# Patient Record
Sex: MALE | Race: WHITE | NOT HISPANIC OR LATINO | URBAN - NONMETROPOLITAN AREA
[De-identification: names, ages, dates, MRNs, and addresses within clinical notes are randomized per-mention and may not be internally consistent; named-entity substitution may affect disease eponyms.]

---

## 2020-03-09 ENCOUNTER — IMPORTED ENCOUNTER (OUTPATIENT)
Dept: URBAN - NONMETROPOLITAN AREA CLINIC 1 | Facility: CLINIC | Age: 45
End: 2020-03-09

## 2020-03-09 PROBLEM — H52.223: Noted: 2020-03-09

## 2020-03-09 PROBLEM — H52.13: Noted: 2020-03-09

## 2020-03-09 PROBLEM — H52.4: Noted: 2020-03-09

## 2020-03-09 PROCEDURE — 92310 CONTACT LENS FITTING OU: CPT

## 2020-03-09 PROCEDURE — 92015 DETERMINE REFRACTIVE STATE: CPT

## 2020-03-09 PROCEDURE — 92004 COMPRE OPH EXAM NEW PT 1/>: CPT

## 2020-03-09 NOTE — PATIENT DISCUSSION
Myopia/Astigmatism/Presbyopia-  Discussed refractive status w/ pt today-  MR done new GLRx given-  Will update CL when pt is ready currently wearing Soft Lens Toric.  He would like to try monovision will order trials and have him return with Maria G to complete fitting. -  Monitor yearly or PRN

## 2020-03-16 ENCOUNTER — IMPORTED ENCOUNTER (OUTPATIENT)
Dept: URBAN - NONMETROPOLITAN AREA CLINIC 1 | Facility: CLINIC | Age: 45
End: 2020-03-16

## 2020-05-13 ENCOUNTER — IMPORTED ENCOUNTER (OUTPATIENT)
Dept: URBAN - NONMETROPOLITAN AREA CLINIC 1 | Facility: CLINIC | Age: 45
End: 2020-05-13

## 2020-05-13 PROCEDURE — V2521 CNTCT LENS HYDROPHILIC TORIC: HCPCS

## 2020-11-23 ENCOUNTER — IMPORTED ENCOUNTER (OUTPATIENT)
Dept: URBAN - NONMETROPOLITAN AREA CLINIC 1 | Facility: CLINIC | Age: 45
End: 2020-11-23

## 2020-11-23 PROBLEM — H52.4: Noted: 2020-11-23

## 2020-11-23 PROBLEM — H52.13: Noted: 2020-11-23

## 2020-11-23 PROBLEM — H20.011: Noted: 2020-11-23

## 2020-11-23 PROBLEM — H52.223: Noted: 2020-11-23

## 2020-11-23 PROCEDURE — 99213 OFFICE O/P EST LOW 20 MIN: CPT

## 2020-11-23 NOTE — PATIENT DISCUSSION
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  He would like to try monovision will order trials and have him return with Maria G to complete fitting. -  Monitor yearly or PRN

## 2022-04-09 ASSESSMENT — VISUAL ACUITY
OS_SC: 20/20
OD_SC: 20/20-
OD_SC: 20/20
OD_SC: 20/20
OS_SC: 20/20
OS_SC: 20/20
OS_SC: 20/25-
OD_SC: 20/20

## 2022-04-09 ASSESSMENT — TONOMETRY
OS_IOP_MMHG: 15
OD_IOP_MMHG: 16
OD_IOP_MMHG: 14
OS_IOP_MMHG: 17

## 2023-12-12 ENCOUNTER — NEW PATIENT (OUTPATIENT)
Dept: URBAN - NONMETROPOLITAN AREA CLINIC 1 | Facility: CLINIC | Age: 48
End: 2023-12-12

## 2023-12-12 DIAGNOSIS — H52.4: ICD-10-CM

## 2023-12-12 PROCEDURE — 92004 COMPRE OPH EXAM NEW PT 1/>: CPT

## 2023-12-12 PROCEDURE — 92015 DETERMINE REFRACTIVE STATE: CPT

## 2023-12-12 ASSESSMENT — TONOMETRY
OS_IOP_MMHG: 15
OD_IOP_MMHG: 15

## 2023-12-12 ASSESSMENT — VISUAL ACUITY
OS_CC: 20/30
OU_CC: 20/25
OD_CC: 20/20-1

## 2025-03-25 ENCOUNTER — CONTACT LENSES/GLASSES VISIT (OUTPATIENT)
Age: 50
End: 2025-03-25

## 2025-03-25 DIAGNOSIS — H52.4: ICD-10-CM

## 2025-03-25 PROCEDURE — 92015 DETERMINE REFRACTIVE STATE: CPT

## 2025-03-25 PROCEDURE — 92014 COMPRE OPH EXAM EST PT 1/>: CPT
